# Patient Record
Sex: FEMALE | Race: WHITE | NOT HISPANIC OR LATINO
[De-identification: names, ages, dates, MRNs, and addresses within clinical notes are randomized per-mention and may not be internally consistent; named-entity substitution may affect disease eponyms.]

---

## 2022-12-02 ENCOUNTER — APPOINTMENT (OUTPATIENT)
Dept: VASCULAR SURGERY | Facility: CLINIC | Age: 52
End: 2022-12-02

## 2022-12-02 PROCEDURE — 99204 OFFICE O/P NEW MOD 45 MIN: CPT

## 2022-12-07 PROBLEM — Z00.00 ENCOUNTER FOR PREVENTIVE HEALTH EXAMINATION: Status: ACTIVE | Noted: 2022-12-07

## 2022-12-09 DIAGNOSIS — Z01.812 ENCOUNTER FOR PREPROCEDURAL LABORATORY EXAMINATION: ICD-10-CM

## 2022-12-09 DIAGNOSIS — Q28.2 ARTERIOVENOUS MALFORMATION OF CEREBRAL VESSELS: ICD-10-CM

## 2023-01-01 LAB
ANION GAP SERPL CALC-SCNC: 9 MMOL/L
BUN SERPL-MCNC: 7 MG/DL
CALCIUM SERPL-MCNC: 9.3 MG/DL
CHLORIDE SERPL-SCNC: 104 MMOL/L
CO2 SERPL-SCNC: 26 MMOL/L
CREAT SERPL-MCNC: 0.83 MG/DL
EGFR: 85 ML/MIN/1.73M2
GLUCOSE SERPL-MCNC: 84 MG/DL
POTASSIUM SERPL-SCNC: 4.4 MMOL/L
SODIUM SERPL-SCNC: 139 MMOL/L

## 2023-01-10 ENCOUNTER — RESULT REVIEW (OUTPATIENT)
Age: 53
End: 2023-01-10

## 2023-01-12 PROBLEM — Z01.818 PREOP TESTING: Status: ACTIVE | Noted: 2023-01-12

## 2023-01-13 ENCOUNTER — APPOINTMENT (OUTPATIENT)
Dept: NEUROSURGERY | Facility: CLINIC | Age: 53
End: 2023-01-13
Payer: COMMERCIAL

## 2023-01-13 VITALS
DIASTOLIC BLOOD PRESSURE: 83 MMHG | WEIGHT: 145 LBS | HEIGHT: 65 IN | HEART RATE: 78 BPM | BODY MASS INDEX: 24.16 KG/M2 | SYSTOLIC BLOOD PRESSURE: 129 MMHG

## 2023-01-13 DIAGNOSIS — Q27.39 ARTERIOVENOUS MALFORMATION, OTHER SITE: ICD-10-CM

## 2023-01-13 DIAGNOSIS — Z01.818 ENCOUNTER FOR OTHER PREPROCEDURAL EXAMINATION: ICD-10-CM

## 2023-01-13 PROCEDURE — 99205 OFFICE O/P NEW HI 60 MIN: CPT

## 2023-01-14 NOTE — HISTORY OF PRESENT ILLNESS
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  Imaging work up was ordered by her PCP Dr. Kelsey.  She has also been evaluated by Dr. Vora/Vascular surgery, who suggested she see a neuroendovascular specialist.  \par The patient denies headaches, visual change, numbness, weakness of extremities, speech disturbance, dizziness, neck pain, vertigo.\par She has undergone imaging in the form of MRi orbit/face/neck soft tissue at [facility] on 9/28/22 which showed a lobulated T2 hyperintense lesion in left neck2.0x 1.2cm x 1.7cm demonstrating avid diffuse enhancement.  Loculated T2 hyperintense lesion in the left supraclavicular soft tissues 2.5 x 1.5, x1.3cm with associated calcification. Lobulated T2 hyperintense lesion in right lower neck adjacent to posteroinferior aspect of the right thyroid gland measuring 1.2 x 1.8. 2.0 cm. Asymmetric cluster of tortuous curvilinear lower neck/upper thorax, asymmetric prominent vessels in left submandibular region, suggested to be venous. L parotid 0.7cm high signal focus. Possible DVA.  CTA h/n was ordered which shows a tangle of arterialized draining superficial veins in the superficial parotid gland on the left concerning for AVM.  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

## 2023-01-14 NOTE — ASSESSMENT
[FreeTextEntry1] : I have discussed the natural history and treatment options for AVMs with the patient. I explained the indications for observation and imaging surveillance, medical management, endovascular therapies and surgery. I discussed the risks, benefits, possible complications and expected outcome related to each treatment option.\par \par In the end, I said it was reasonable to further evaluate the AVM with a diagnostic cerebral angiogram, and she would furthermore need an angiogram of the aorta and subclavian arteries which we will coordinate along with my partner Dr. James Sarkar to do together during the same procedure.  We will additionally have her undergo preprocedurally a CTA of the chest and abdomen to further evaluate for HHT (hereditary hemorrhagic telangiectasia) and we will do a TTE with bubble study to evaluate for the presence of additional AVMs elsewhere in the body and the heart. The patient opted to proceed with the angiogram and would prefer to wait until March to coordinate.  She would prefer preop testing/clearance with PST here at [facility] for convenience. My office will reach out to schedule in the coming weeks once we coordinate with Dr. Sarkar' team.\par \par The patient understands the plan of care and is in agreement.  All questions answered to patient satisfaction.\par \par \par

## 2023-01-14 NOTE — PHYSICAL EXAM
[General Appearance - Alert] : alert [General Appearance - In No Acute Distress] : in no acute distress [Oriented To Time, Place, And Person] : oriented to person, place, and time [Impaired Insight] : insight and judgment were intact [Affect] : the affect was normal [Person] : oriented to person [Place] : oriented to place [Time] : oriented to time [Short Term Intact] : short term memory intact [Remote Intact] : remote memory intact [Span Intact] : the attention span was normal [Concentration Intact] : normal concentrating ability [Fluency] : fluency intact [Comprehension] : comprehension intact [Current Events] : adequate knowledge of current events [Past History] : adequate knowledge of personal past history [Vocabulary] : adequate range of vocabulary [Cranial Nerves Optic (II)] : visual acuity intact bilaterally,  pupils equal round and reactive to light [Cranial Nerves Oculomotor (III)] : extraocular motion intact [Cranial Nerves Trigeminal (V)] : facial sensation intact symmetrically [Cranial Nerves Facial (VII)] : face symmetrical [Cranial Nerves Vestibulocochlear (VIII)] : hearing was intact bilaterally [Cranial Nerves Glossopharyngeal (IX)] : tongue and palate midline [Cranial Nerves Accessory (XI - Cranial And Spinal)] : head turning and shoulder shrug symmetric [Cranial Nerves Hypoglossal (XII)] : there was no tongue deviation with protrusion [Motor Tone] : muscle tone was normal in all four extremities [Motor Strength] : muscle strength was normal in all four extremities [No Muscle Atrophy] : normal bulk in all four extremities [Sensation Tactile Decrease] : light touch was intact [Abnormal Walk] : normal gait [Balance] : balance was intact [Tremor] : a tremor present [2+] : Patella left 2+ [FreeTextEntry1] : mild tremor of the head and neck [Past-pointing] : there was no past-pointing

## 2023-01-14 NOTE — DATA REVIEWED
[de-identified] : \par  CT Report             Final\par \par No Documents Attached\par \par \par \par \par   [facility]\par                                          [address]\par                                    [place], [place]  [address]\par                                        Department of Radiology\par                                             [phone]\par \par \par Patient Name:      WILLIE BENOIT                Location:       [facility]\par Med Rec #:        [id number]                    Account #:      [id number]\par YOB: 1970                    Ordering:       Desiree Buckley\par Age: 52               Sex:    F                 Attending:      Desiree Buckley\par PCP:        Jaison Montez MD\par ______________________________________________________________________________________\par \par Exam Date:      01/10/23\par Exam:         CTA HEAD (W)AW IC; CTA NECK (W)AW IC\par \par Order#:       [id number]; [id number]\par \par \par \par CLINICAL INFORMATION:  POSSIBLE AVM NECK\par \par \par  TECHNIQUE: After a bolus of IV contrast is administered, a CT angiogram of the vertebral and\par carotid arterial vasculature from the aortic arch to the skull vertex is performed. Images are\par reformatted in sagittal and coronal planes. Maximum intensity projection images are submitted.\par  90 cc of Omnipaque 350 are administered without event. 10 cc are discarded.\par \par  The study is correlated with an MRI of the orbits face and neck from 9/28/2022.\par \par  FINDINGS:\par \par  There is a bovine aortic arch. Left vertebral artery arises directly from the aortic arch. Common\par carotid arteries are widely patent from the origins to the bifurcations. There is no significant\par atherosclerotic disease at the carotid bifurcations. Cervical internal carotid arteries are patent\par without stenosis based on NASCET criteria. Cervical vertebral arteries are patent from the origins\par to the skull base. The vertebral arteries are near codominant. There is no evidence of cervical\par carotid or vertebral artery dissection.\par \par  On the left, there are prominent draining veins into the left external jugular vein in the\par perisubmandibular and infraparotid region. There is a tangle of arterialized draining superficial\par veins in the superficial left parotid region and inferior pinna which appear to communicate with a\par branch of the left posterior auricular artery.\par \par  The skull base and intracranial carotid arteries are patent without significant stenosis. The\par proximal MCAs and ACAs are patent without significant stenosis. The anterior communicating artery is\par visualized. Distal LORNA and MCA branches are grossly symmetric.\par \par  The skull base and intradural vertebral arteries and basilar artery are patent without significant\par stenosis. There is a left-sided fetal PCA. The PCAs are patent without significant stenosis. The\par superior cerebellar artery origins, a right AICA/PICA, a left AICA, and a left PICA are identified.\par \par  There is no evidence of an intracranial arterial aneurysm\par \par  Intracranial superficial and deep venous sinus system are grossly unremarkable.\par \par  The regional soft tissues of the neck are otherwise unremarkable apart from shoddy cervical lymph\par nodes. The lung apices are clear. There are degenerative changes of the spine.\par \par \par \par  IMPRESSION:\par \par  CTA NECK:  No significant stenosis of the cervical carotid arteries based on NASCET criteria.\par Patent cervical vertebral arteries.\par \par  Prominent draining veins into the left external jugular vein in the perisubmandibular infraparotid\par region. 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 No evidence\par of an intracranial arterial aneurysm.\par \par  --- End of Report ---\par \par ***Electronically Signed ***\par -----------------------------------------------\par Nell Boland DO              01/12/23 1556\par \par Dictated on 01/12/23\par \par \par Report cc:  Desiree Buckley;\par \par  \par \par  Ordered by: DESIREE BUCKLEY       Collected/Examined: 10Jan2023 01:45PM       \par Verification Required       Stage: Final       \par  Performed at: [facility]       Resulted: 12Jan2023 03:56PM       Last Updated: 12Jan2023 04:00PM       Accession: [id number]

## 2023-02-13 ENCOUNTER — RESULT REVIEW (OUTPATIENT)
Age: 53
End: 2023-02-13

## 2023-03-06 ENCOUNTER — RESULT REVIEW (OUTPATIENT)
Age: 53
End: 2023-03-06

## 2023-03-08 ENCOUNTER — RESULT REVIEW (OUTPATIENT)
Age: 53
End: 2023-03-08

## 2023-03-15 ENCOUNTER — RESULT REVIEW (OUTPATIENT)
Age: 53
End: 2023-03-15

## 2023-03-15 ENCOUNTER — APPOINTMENT (OUTPATIENT)
Dept: NEUROSURGERY | Facility: HOSPITAL | Age: 53
End: 2023-03-15